# Patient Record
Sex: MALE | Race: WHITE
[De-identification: names, ages, dates, MRNs, and addresses within clinical notes are randomized per-mention and may not be internally consistent; named-entity substitution may affect disease eponyms.]

---

## 2018-03-31 ENCOUNTER — HOSPITAL ENCOUNTER (EMERGENCY)
Dept: HOSPITAL 46 - ED | Age: 58
End: 2018-03-31
Payer: MEDICARE

## 2018-03-31 DIAGNOSIS — S27.0XXA: ICD-10-CM

## 2018-03-31 DIAGNOSIS — F11.90: ICD-10-CM

## 2018-03-31 DIAGNOSIS — K21.9: ICD-10-CM

## 2018-03-31 DIAGNOSIS — S22.43XA: Primary | ICD-10-CM

## 2018-03-31 DIAGNOSIS — Z79.899: ICD-10-CM

## 2018-03-31 DIAGNOSIS — F41.9: ICD-10-CM
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
## 2018-03-31 DIAGNOSIS — J44.9: ICD-10-CM

## 2018-03-31 DIAGNOSIS — Z88.8: ICD-10-CM

## 2018-03-31 DIAGNOSIS — R46.89: ICD-10-CM

## 2018-03-31 DIAGNOSIS — F17.200: ICD-10-CM

## 2018-03-31 DIAGNOSIS — S27.892A: ICD-10-CM

## 2018-03-31 DIAGNOSIS — F10.129: ICD-10-CM

## 2018-03-31 PROCEDURE — G0390 TRAUMA RESPONS W/HOSP CRITI: HCPCS

## 2018-03-31 PROCEDURE — G0480 DRUG TEST DEF 1-7 CLASSES: HCPCS
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
## 2018-03-31 NOTE — EKG
[facility]
                                    [address] [facility]
                                  [place]  [address]
_________________________________________________________________________________________
                                                                 Signed   
 
 
Sinus tachycardia
Indeterminate axis
Right ventricular hypertrophy
Abnormal ECG
No previous ECGs available
Confirmed by VIVIANA SCHAEFER MD ([id number]) on 3/31/2018 10:53:01 AM
 
 
 
 
 
 
 
 
 
 
 
 
 
 
 
 
 
 
 
 
 
 
 
 
 
 
 
 
 
 
 
 
 
 
 
 
 
 
 
    Electronically Signed By: VIVIANA SCHAEFER MD  03/31/18 1053
_________________________________________________________________________________________
PATIENT NAME:     AUBREY ROSALES                       
MEDICAL RECORD #: [id number]                     Electrocardiogram             
          ACCT #: [id number]  
DATE OF BIRTH:   [birth year]                                       
PHYSICIAN:   VIVIANA SCHAEFER MD           REPORT #: [id number]
REPORT IS CONFIDENTIAL AND NOT TO BE RELEASED WITHOUT AUTHORIZATION
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
